# Patient Record
Sex: FEMALE | URBAN - METROPOLITAN AREA
[De-identification: names, ages, dates, MRNs, and addresses within clinical notes are randomized per-mention and may not be internally consistent; named-entity substitution may affect disease eponyms.]

---
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
## 2024-09-10 ENCOUNTER — APPOINTMENT (OUTPATIENT)
Dept: RADIOLOGY | Facility: HOSPITAL | Age: 24
End: 2024-09-10
Payer: MEDICAID

## 2024-09-10 ENCOUNTER — HOSPITAL ENCOUNTER (EMERGENCY)
Facility: HOSPITAL | Age: 24
Discharge: HOME | End: 2024-09-10
Attending: STUDENT IN AN ORGANIZED HEALTH CARE EDUCATION/TRAINING PROGRAM
Payer: MEDICAID

## 2024-09-10 VITALS
DIASTOLIC BLOOD PRESSURE: 51 MMHG | TEMPERATURE: 97.5 F | OXYGEN SATURATION: 98 % | WEIGHT: 130 LBS | HEIGHT: 61 IN | BODY MASS INDEX: 24.55 KG/M2 | HEART RATE: 88 BPM | RESPIRATION RATE: 18 BRPM | SYSTOLIC BLOOD PRESSURE: 96 MMHG
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
## 2024-09-10 PROCEDURE — 73130 X-RAY EXAM OF HAND: CPT | Mod: LT

## 2024-09-10 PROCEDURE — 99283 EMERGENCY DEPT VISIT LOW MDM: CPT

## 2024-09-10 PROCEDURE — 2500000001 HC RX 250 WO HCPCS SELF ADMINISTERED DRUGS (ALT 637 FOR MEDICARE OP): Performed by: STUDENT IN AN ORGANIZED HEALTH CARE EDUCATION/TRAINING PROGRAM

## 2024-09-10 PROCEDURE — 73130 X-RAY EXAM OF HAND: CPT | Mod: LEFT SIDE | Performed by: RADIOLOGY

## 2024-09-10 RX ORDER — AMOXICILLIN AND CLAVULANATE POTASSIUM 875; 125 MG/1; MG/1
1 TABLET, FILM COATED ORAL ONCE
Status: COMPLETED | OUTPATIENT
Start: 2024-09-10 | End: 2024-09-10

## 2024-09-10 RX ORDER — AMOXICILLIN AND CLAVULANATE POTASSIUM 875; 125 MG/1; MG/1
1 TABLET, FILM COATED ORAL EVERY 12 HOURS
Qty: 14 TABLET | Refills: 0 | Status: SHIPPED | OUTPATIENT
Start: 2024-09-10 | End: 2024-09-17

## 2024-09-10 ASSESSMENT — PAIN SCALES - GENERAL: PAINLEVEL_OUTOF10: 5 - MODERATE PAIN

## 2024-09-10 ASSESSMENT — LIFESTYLE VARIABLES
TOTAL SCORE: 0
HAVE YOU EVER FELT YOU SHOULD CUT DOWN ON YOUR DRINKING: NO
EVER FELT BAD OR GUILTY ABOUT YOUR DRINKING: NO
EVER HAD A DRINK FIRST THING IN THE MORNING TO STEADY YOUR NERVES TO GET RID OF A HANGOVER: NO
HAVE PEOPLE ANNOYED YOU BY CRITICIZING YOUR DRINKING: NO

## 2024-09-10 ASSESSMENT — PAIN DESCRIPTION - ORIENTATION: ORIENTATION: LEFT

## 2024-09-10 ASSESSMENT — PAIN DESCRIPTION - LOCATION: LOCATION: HAND

## 2024-09-10 ASSESSMENT — COLUMBIA-SUICIDE SEVERITY RATING SCALE - C-SSRS
6. HAVE YOU EVER DONE ANYTHING, STARTED TO DO ANYTHING, OR PREPARED TO DO ANYTHING TO END YOUR LIFE?: NO
1. IN THE PAST MONTH, HAVE YOU WISHED YOU WERE DEAD OR WISHED YOU COULD GO TO SLEEP AND NOT WAKE UP?: NO
2. HAVE YOU ACTUALLY HAD ANY THOUGHTS OF KILLING YOURSELF?: NO

## 2024-09-10 ASSESSMENT — PAIN DESCRIPTION - FREQUENCY: FREQUENCY: CONSTANT/CONTINUOUS

## 2024-09-10 ASSESSMENT — PAIN - FUNCTIONAL ASSESSMENT: PAIN_FUNCTIONAL_ASSESSMENT: 0-10

## 2024-09-10 NOTE — DISCHARGE INSTRUCTIONS
Please return to close ED for any worsening symptoms including fever, chills, excessive swelling, numbness/tingling in the hand, excessive bleeding, or weakness.  Please keep wound clean.  Please refrain from agitating wound, or doing any strenuous activity at this time.  Please take antibiotic course as prescribed.

## 2024-09-10 NOTE — ED PROVIDER NOTES
HPI   Chief Complaint   Patient presents with    Animal Bite     Dog bite at home 45 minutes prior to arrival. Bleeding controlled.        Patient is a 24-year-old female with no segment past medical history presenting to [facility] ED for dog bite injury.  Patient reports that her 2 dogs were fighting in her house, and she was trying to separate them and was caught by their teeth on her left hand.  Patient reports that both dogs were vaccinated.  Patient denies any numbness/tingling of the left hand.  Patient denies any loss of function.  Patient denies any significant/persistent bleeding.  Remaining review of system, otherwise unremarkable.              Patient History   Past Medical History:   Diagnosis Date    Anemia     Anxiety     Depression     Pre-eclampsia ([facility]CC)      History reviewed. No pertinent surgical history.  No family history on file.  Social History     Tobacco Use    Smoking status: Never    Smokeless tobacco: Never   Substance Use Topics    Alcohol use: Not on file    Drug use: Never       Physical Exam   ED Triage Vitals [09/10/24 1748]   Temperature Heart Rate Respirations BP   36.4 °C (97.5 °F) 83 16 128/62      Pulse Ox Temp src Heart Rate Source Patient Position   100 % -- -- --      BP Location FiO2 (%)     -- --       Physical Exam  Constitutional:       Appearance: Normal appearance. She is normal weight.   HENT:      Head: Normocephalic and atraumatic.      Nose: Nose normal.      Mouth/Throat:      Mouth: Mucous membranes are moist.      Pharynx: Oropharynx is clear.   Eyes:      Extraocular Movements: Extraocular movements intact.      Conjunctiva/sclera: Conjunctivae normal.      Pupils: Pupils are equal, round, and reactive to light.   Cardiovascular:      Rate and Rhythm: Normal rate and regular rhythm.      Pulses: Normal pulses.      Heart sounds: Normal heart sounds.   Pulmonary:      Effort: Pulmonary effort is normal.      Breath sounds: Normal breath sounds.   Abdominal:       General: Abdomen is flat. Bowel sounds are normal.      Palpations: Abdomen is soft.   Musculoskeletal:         General: Normal range of motion.      Cervical back: Normal range of motion and neck supple.   Skin:     General: Skin is warm and dry.      Capillary Refill: Capillary refill takes less than 2 seconds.      Comments: 1 cm laceration just inferior to the base of the fourth and fifth digits of the left palmar surface.  Superficial abrasions of the left palm as well   Neurological:      General: No focal deficit present.      Mental Status: She is alert and oriented to person, place, and time. Mental status is at baseline.   Psychiatric:         Mood and Affect: Mood normal.         Behavior: Behavior normal.           ED Course & MDM   Diagnoses as of 09/11/24 1446   Dog bite, initial encounter                 No data recorded     Pollo Coma Scale Score: 15 (09/10/24 1750 : Keyla Carreno RN)                           Medical Decision Making  Patient is a 24 y.o. female who presents to [facility] ED for Animal Bite (Dog bite at home 45 minutes prior to arrival. Bleeding controlled. ). On initial ED evaluation, patient found to be in no acute distress. Per HPI, concern to evaluate and treat for dog bite injury.  Obtain x-ray films of the left hand.  Administering first dose Augmentin antibiotics in ED.  Patient dog bite wound was flushed thoroughly, left open to continue drainage.  X-ray showed no underlying acute traumatic injury or retained foreign body.  Patient be discharged on outpatient course of Augmentin antibiotics.  Diagnostic findings and treatment plan discussed with patient.  Patient amenable to plan.    Rx given for Augmentin. Patient to follow up with PCP outpatient. Anticipatory guidance and return precautions provided.  Patient otherwise stable for discharge.          Procedure  Procedures     Caridad Rice MD  Resident  09/11/24 [id number]

## 2024-10-16 ENCOUNTER — OFFICE VISIT (OUTPATIENT)
Dept: URGENT CARE | Age: 24
End: 2024-10-16
Payer: MEDICAID

## 2024-10-16 VITALS
TEMPERATURE: 98.7 F | WEIGHT: 130 LBS | HEART RATE: 112 BPM | BODY MASS INDEX: 24.56 KG/M2 | DIASTOLIC BLOOD PRESSURE: 67 MMHG | SYSTOLIC BLOOD PRESSURE: 115 MMHG | RESPIRATION RATE: 16 BRPM | OXYGEN SATURATION: 97 %

## 2024-10-16 DIAGNOSIS — R52 BODY ACHES: ICD-10-CM

## 2024-10-16 DIAGNOSIS — J00 NASOPHARYNGITIS ACUTE: Primary | ICD-10-CM

## 2024-10-16 LAB
POC RAPID INFLUENZA A: NEGATIVE
POC RAPID INFLUENZA B: NEGATIVE
POC RAPID STREP: NEGATIVE
POC SARS-COV-2 AG BINAX: NORMAL

## 2024-10-16 RX ORDER — BROMPHENIRAMINE MALEATE, PSEUDOEPHEDRINE HYDROCHLORIDE, AND DEXTROMETHORPHAN HYDROBROMIDE 2; 30; 10 MG/5ML; MG/5ML; MG/5ML
10 SYRUP ORAL 3 TIMES DAILY PRN
Qty: 150 ML | Refills: 0 | Status: SHIPPED | OUTPATIENT
Start: 2024-10-16 | End: 2024-10-21

## 2024-10-16 NOTE — PROGRESS NOTES
Subjective   Patient ID: Aston Angel is a 24 y.o. female. They present today with a chief complaint of Illness (X 3 days, sore throat, headache, runny nose, body aches, nausea.).    History of Present Illness  Subjective  Aston Angel is a 24 y.o. female who presents for evaluation of symptoms of a URI. Symptoms include achiness, low grade fever, nasal congestion, and sore throat. Onset of symptoms was 2 days ago and has been stable since that time.     Discussed diagnosis and treatment of URI.  Suggested symptomatic OTC remedies.  Nasal saline spray for congestion.  Follow up as needed.        Illness      Past Medical History  Allergies as of 10/16/2024    (No Known Allergies)       (Not in a hospital admission)       Past Medical History:   Diagnosis Date    Anemia     Anxiety     Depression     Pre-eclampsia        History reviewed. No pertinent surgical history.     reports that she has never smoked. She has never used smokeless tobacco. She reports that she does not use drugs.    Review of Systems  Review of Systems                               Objective    Vitals:    10/16/24 1631   BP: 115/67   Pulse: (!) 112   Resp: 16   Temp: 37.1 °C (98.7 °F)   SpO2: 97%   Weight: 59 kg (130 lb)     No LMP recorded.    Physical Exam  Constitutional:       General: She is not in acute distress.     Appearance: Normal appearance.   HENT:      Right Ear: Tympanic membrane, ear canal and external ear normal.      Left Ear: Tympanic membrane, ear canal and external ear normal.      Nose: Congestion and rhinorrhea present.      Mouth/Throat:      Mouth: Mucous membranes are moist.      Pharynx: Oropharynx is clear.   Eyes:      Extraocular Movements: Extraocular movements intact.      Pupils: Pupils are equal, round, and reactive to light.   Cardiovascular:      Rate and Rhythm: Normal rate and regular rhythm.      Pulses: Normal pulses.      Heart sounds: Normal heart sounds.   Pulmonary:      Effort: Pulmonary effort is  normal. No respiratory distress.      Breath sounds: Normal breath sounds. No stridor. No wheezing or rhonchi.   Musculoskeletal:      Cervical back: Normal range of motion and neck supple. No rigidity or tenderness.   Neurological:      Mental Status: She is alert.         Procedures    Point of Care Test & Imaging Results from this visit  Results for orders placed or performed in visit on 10/16/24   POCT Covid-19 Rapid Antigen   Result Value Ref Range    POC SEJAL-COV-2 AG  Presumptive negative test for SARS-CoV-2 (no antigen detected)     Presumptive negative test for SARS-CoV-2 (no antigen detected)   POCT Influenza A/B manually resulted   Result Value Ref Range    POC Rapid Influenza A Negative Negative    POC Rapid Influenza B Negative Negative   POCT rapid strep A manually resulted   Result Value Ref Range    POC Rapid Strep Negative Negative      No results found.    Diagnostic study results (if any) were reviewed by Susan Argueta MD.    Assessment/Plan   Allergies, medications, history, and pertinent labs/EKGs/Imaging reviewed by Susan Argueta MD.    Orders and Diagnoses  Diagnoses and all orders for this visit:  Body aches  -     POCT Covid-19 Rapid Antigen  -     POCT Influenza A/B manually resulted  -     POCT rapid strep A manually resulted      Medical Admin Record      Patient disposition: Home    Electronically signed by Susan Argueta MD  4:40 PM

## 2024-10-16 NOTE — LETTER
October 16, 2024     Patient: Aston Angel   YOB: 2000   Date of Visit: 10/16/2024       To Whom It May Concern:    Aston Angel was seen in my clinic on 10/16/2024 at 4:25 pm. Please excuse Aston for her absence from work on this day to make the appointment.    If you have any questions or concerns, please don't hesitate to call.         Sincerely,         Susan Argueta MD        CC: No Recipients

## 2025-06-17 ENCOUNTER — OFFICE VISIT (OUTPATIENT)
Dept: OBSTETRICS AND GYNECOLOGY | Facility: CLINIC | Age: 25
End: 2025-06-17
Payer: COMMERCIAL

## 2025-06-17 ENCOUNTER — APPOINTMENT (OUTPATIENT)
Dept: PRIMARY CARE | Facility: CLINIC | Age: 25
End: 2025-06-17
Payer: COMMERCIAL

## 2025-06-17 ENCOUNTER — APPOINTMENT (OUTPATIENT)
Dept: LAB | Facility: HOSPITAL | Age: 25
End: 2025-06-17
Payer: COMMERCIAL

## 2025-06-17 ENCOUNTER — OFFICE VISIT (OUTPATIENT)
Dept: PRIMARY CARE | Facility: CLINIC | Age: 25
End: 2025-06-17
Payer: COMMERCIAL

## 2025-06-17 VITALS
WEIGHT: 115 LBS | SYSTOLIC BLOOD PRESSURE: 110 MMHG | DIASTOLIC BLOOD PRESSURE: 71 MMHG | BODY MASS INDEX: 21.71 KG/M2 | HEIGHT: 61 IN | HEART RATE: 75 BPM

## 2025-06-17 VITALS
HEIGHT: 61 IN | TEMPERATURE: 97 F | RESPIRATION RATE: 16 BRPM | HEART RATE: 82 BPM | BODY MASS INDEX: 21.52 KG/M2 | DIASTOLIC BLOOD PRESSURE: 78 MMHG | WEIGHT: 114 LBS | OXYGEN SATURATION: 99 % | SYSTOLIC BLOOD PRESSURE: 106 MMHG

## 2025-06-17 DIAGNOSIS — Z01.419 ENCOUNTER FOR GYNECOLOGICAL EXAMINATION: ICD-10-CM

## 2025-06-17 DIAGNOSIS — B35.6 TINEA CRURIS: Primary | ICD-10-CM

## 2025-06-17 DIAGNOSIS — Z13.9 SCREENING DUE: ICD-10-CM

## 2025-06-17 DIAGNOSIS — Z12.4 CERVICAL CANCER SCREENING: Primary | ICD-10-CM

## 2025-06-17 PROBLEM — D50.8 IRON DEFICIENCY ANEMIA SECONDARY TO INADEQUATE DIETARY IRON INTAKE: Status: ACTIVE | Noted: 2023-10-05

## 2025-06-17 PROBLEM — O14.93 PRE-ECLAMPSIA IN THIRD TRIMESTER (HHS-HCC): Status: ACTIVE | Noted: 2023-10-05

## 2025-06-17 PROBLEM — F32.A DEPRESSION: Status: ACTIVE | Noted: 2021-06-02

## 2025-06-17 PROBLEM — F41.9 ANXIETY: Status: ACTIVE | Noted: 2021-06-02

## 2025-06-17 PROCEDURE — 3074F SYST BP LT 130 MM HG: CPT

## 2025-06-17 PROCEDURE — 3078F DIAST BP <80 MM HG: CPT

## 2025-06-17 PROCEDURE — 1036F TOBACCO NON-USER: CPT

## 2025-06-17 PROCEDURE — 99203 OFFICE O/P NEW LOW 30 MIN: CPT | Performed by: NURSE PRACTITIONER

## 2025-06-17 PROCEDURE — 3008F BODY MASS INDEX DOCD: CPT

## 2025-06-17 PROCEDURE — 99385 PREV VISIT NEW AGE 18-39: CPT

## 2025-06-17 PROCEDURE — 87661 TRICHOMONAS VAGINALIS AMPLIF: CPT

## 2025-06-17 PROCEDURE — 87591 N.GONORRHOEAE DNA AMP PROB: CPT

## 2025-06-17 PROCEDURE — 87491 CHLMYD TRACH DNA AMP PROBE: CPT

## 2025-06-17 RX ORDER — KETOCONAZOLE 20 MG/G
CREAM TOPICAL DAILY
Qty: 30 G | Refills: 0 | Status: SHIPPED | OUTPATIENT
Start: 2025-06-17 | End: 2025-07-01

## 2025-06-17 ASSESSMENT — ENCOUNTER SYMPTOMS
DYSURIA: 0
CHILLS: 0
COUGH: 0
SORE THROAT: 0
POLYPHAGIA: 0
SORE THROAT: 0
FEVER: 0
WEAKNESS: 0
FREQUENCY: 0
EYE PAIN: 0
DIARRHEA: 0
MYALGIAS: 0
SINUS PRESSURE: 0
FATIGUE: 0
SHORTNESS OF BREATH: 0
BACK PAIN: 0
FATIGUE: 0
RHINORRHEA: 0
COUGH: 0
VOMITING: 0
ANOREXIA: 0
VOMITING: 0
PALPITATIONS: 0
DIZZINESS: 0
CONSTIPATION: 0
NAIL CHANGES: 0
NAUSEA: 0
DYSPHORIC MOOD: 0
SHORTNESS OF BREATH: 0
NERVOUS/ANXIOUS: 0
FEVER: 0
EYE PAIN: 0
ABDOMINAL PAIN: 0
DEPRESSION: 0
HEADACHES: 0
SLEEP DISTURBANCE: 0
WHEEZING: 0
POLYDIPSIA: 0
DIARRHEA: 0
SINUS PAIN: 0

## 2025-06-17 NOTE — PROGRESS NOTES
Subjective   Patient ID: Aston Angel is a 25 y.o. female who presents for Establish Care.    Patient is being seen today to establish care and for possible yeast in her groin area, She states it is very itchy and red. She has tried using several OTC creams that have helped with some of the itching but has not gone away fully.     Vaginal Itching  The patient's primary symptoms include genital itching and a genital rash. The patient's pertinent negatives include no genital odor, pelvic pain or vaginal discharge. This is a new problem. The current episode started more than 1 month ago (2 months ago). The problem occurs constantly. The problem has been waxing and waning. The patient is experiencing no pain. The problem affects both sides. She is not pregnant. Associated symptoms include rash. Pertinent negatives include no abdominal pain, back pain, chills, constipation, diarrhea, discolored urine, dysuria, fever, frequency, headaches, nausea, sore throat, urgency or vomiting.   She states that the rash is in her skin folds bilaterally in groin area. She has tried jock itch cream with little.    She states that she is actively trying to get pregnant. She has an appointment scheduled with GYN later this afternoon.     LMP: Approximately 2 weeks ago.    Review of Systems   Constitutional:  Negative for chills, fatigue and fever.   HENT:  Negative for congestion, sinus pressure, sinus pain and sore throat.    Eyes:  Negative for pain and visual disturbance.   Respiratory:  Negative for cough, shortness of breath and wheezing.    Cardiovascular:  Negative for chest pain and palpitations.   Gastrointestinal:  Negative for abdominal pain, constipation, diarrhea, nausea and vomiting.   Endocrine: Negative for polydipsia, polyphagia and polyuria.   Genitourinary:  Negative for dyspareunia, dysuria, frequency, menstrual problem, pelvic pain, urgency, vaginal discharge and vaginal pain.   Musculoskeletal:  Negative for back  "pain and myalgias.   Skin:  Positive for rash.   Neurological:  Negative for dizziness, weakness and headaches.   Psychiatric/Behavioral:  Negative for dysphoric mood and sleep disturbance. The patient is not nervous/anxious.        Objective   /78 (BP Location: Left arm, Patient Position: Sitting, BP Cuff Size: Adult)   Pulse 82   Temp 36.1 °C (97 °F) (Temporal)   Resp 16   Ht 1.549 m (5' 1\")   Wt 51.7 kg (114 lb)   SpO2 99%   BMI 21.54 kg/m²     Physical Exam  Vitals and nursing note reviewed.   Constitutional:       Appearance: Normal appearance. She is normal weight.   HENT:      Head: Normocephalic and atraumatic.      Right Ear: Tympanic membrane normal.      Left Ear: Tympanic membrane normal.      Nose: No congestion.      Mouth/Throat:      Mouth: Mucous membranes are moist.      Pharynx: Oropharynx is clear. No oropharyngeal exudate or posterior oropharyngeal erythema.   Eyes:      Conjunctiva/sclera: Conjunctivae normal.      Pupils: Pupils are equal, round, and reactive to light.   Cardiovascular:      Rate and Rhythm: Normal rate and regular rhythm.      Heart sounds: Normal heart sounds.   Pulmonary:      Effort: Pulmonary effort is normal.      Breath sounds: Normal breath sounds. No wheezing, rhonchi or rales.   Abdominal:      General: Abdomen is flat. Bowel sounds are normal.      Palpations: Abdomen is soft.      Tenderness: There is no abdominal tenderness.   Musculoskeletal:      Right lower leg: No edema.      Left lower leg: No edema.   Lymphadenopathy:      Cervical: No cervical adenopathy.   Skin:     Findings: Rash present.      Comments: Erythematous, scaly appearing rash noted in bilateral groin folds. No drainage noted.   Neurological:      Mental Status: She is alert and oriented to person, place, and time.      Gait: Gait normal.   Psychiatric:         Mood and Affect: Mood normal.         Behavior: Behavior normal.         Assessment/Plan   Problem List Items Addressed " This Visit    None  Visit Diagnoses         Codes      Tinea cruris    -  Primary B35.6    Relevant Medications    ketoconazole (NIZOral) 2 % cream      Screening due     Z13.9    Relevant Orders    CBC and Auto Differential    Comprehensive Metabolic Panel    Lipid panel    Tsh With Reflex To Free T4 If Abnormal      Body mass index (BMI) of 21.0 to 21.9 in adult     Z68.21        Check labs   Start ketoconazole cream daily as directed.   Discussed short burst of steroid to help with itching, discussed not being able to take if pregnant. As she is actively trying to conceive, will hold on steroids for now and follow up in 2 weeks for recheck, or sooner with any additional concerns.

## 2025-06-17 NOTE — PATIENT INSTRUCTIONS
Check labs and start ketoconazole cream as directed. Follow up in 2 weeks for recheck, or sooner with any additional concerns.

## 2025-06-17 NOTE — PROGRESS NOTES
Subjective   Patient ID: Aston Angel is a 25 y.o. female who presents for Annual Exam.  19 month old female () presenting to [facility]. She s/p  delivery, IOL at 39 weeks for sPEC. She breastfeed for 6 months, no breast issues today. Periods are regular monthly, lasting 5-6 days, moderate flow.    LMP   Last pap : neg  Mammogram at 40 unless otherwise indicated    Medical history: Overall healthy    Surgical history: Denies   Denies family history of breast, uterine, ovarian cancer  Social history: No tobacco, ETOH      Review of Systems   All other systems reviewed and are negative.      Objective   Physical Exam  Vitals and nursing note reviewed.   Constitutional:       Appearance: Normal appearance.   HENT:      Head: Normocephalic.      Nose: Nose normal.      Mouth/Throat:      Mouth: Mucous membranes are moist.      Pharynx: Oropharynx is clear.   Eyes:      Conjunctiva/sclera: Conjunctivae normal.      Pupils: Pupils are equal, round, and reactive to light.   Cardiovascular:      Rate and Rhythm: Normal rate and regular rhythm.      Pulses: Normal pulses.   Pulmonary:      Effort: Pulmonary effort is normal.      Breath sounds: Normal breath sounds.   Chest:   Breasts:     Right: Normal.      Left: Normal.   Abdominal:      General: Bowel sounds are normal.      Palpations: Abdomen is soft.   Genitourinary:     General: Normal vulva.      Exam position: Lithotomy position.      Vagina: Normal.      Cervix: Normal.      Uterus: Normal.       Adnexa: Right adnexa normal and left adnexa normal.      Rectum: Normal.      Comments: Cervical ectropion- no post coital bleeding  Musculoskeletal:         General: Normal range of motion.      Cervical back: Normal range of motion and neck supple.   Skin:     General: Skin is warm and dry.      Capillary Refill: Capillary refill takes less than 2 seconds.   Neurological:      General: No focal deficit present.      Mental Status: She is alert and  oriented to person, place, and time. Mental status is at baseline.   Psychiatric:         Mood and Affect: Mood normal.         Assessment/Plan   Problem List Items Addressed This Visit    None  Visit Diagnoses         Codes      Cervical cancer screening    -  Primary Z12.4    Relevant Orders    THINPREP PAP TEST (25-30)      Encounter for gynecological examination     Z01.419                 OLU Mendoza-CNP 06/17/25 6:59 PM

## 2025-06-18 LAB
ALBUMIN SERPL-MCNC: 4.5 G/DL (ref 3.6–5.1)
ALP SERPL-CCNC: 61 U/L (ref 31–125)
ALT SERPL-CCNC: 30 U/L (ref 6–29)
ANION GAP SERPL CALCULATED.4IONS-SCNC: 7 MMOL/L (CALC) (ref 7–17)
AST SERPL-CCNC: 19 U/L (ref 10–30)
BASOPHILS # BLD AUTO: 57 CELLS/UL (ref 0–200)
BASOPHILS NFR BLD AUTO: 0.9 %
BILIRUB SERPL-MCNC: 0.4 MG/DL (ref 0.2–1.2)
BUN SERPL-MCNC: 8 MG/DL (ref 7–25)
C TRACH RRNA SPEC QL NAA+PROBE: NEGATIVE
CALCIUM SERPL-MCNC: 9.4 MG/DL (ref 8.6–10.2)
CHLORIDE SERPL-SCNC: 104 MMOL/L (ref 98–110)
CHOLEST SERPL-MCNC: 205 MG/DL
CHOLEST/HDLC SERPL: 3.5 (CALC)
CO2 SERPL-SCNC: 28 MMOL/L (ref 20–32)
COTININE UR QL SCN: NEGATIVE
CREAT SERPL-MCNC: 0.55 MG/DL (ref 0.5–0.96)
EGFRCR SERPLBLD CKD-EPI 2021: 130 ML/MIN/1.73M2
EOSINOPHIL # BLD AUTO: 120 CELLS/UL (ref 15–500)
EOSINOPHIL NFR BLD AUTO: 1.9 %
ERYTHROCYTE [DISTWIDTH] IN BLOOD BY AUTOMATED COUNT: 12.5 % (ref 11–15)
GLUCOSE SERPL-MCNC: 91 MG/DL (ref 65–99)
HCT VFR BLD AUTO: 39.8 % (ref 35–45)
HDLC SERPL-MCNC: 58 MG/DL
HGB BLD-MCNC: 13 G/DL (ref 11.7–15.5)
LDLC SERPL CALC-MCNC: 135 MG/DL (CALC)
LYMPHOCYTES # BLD AUTO: 2489 CELLS/UL (ref 850–3900)
LYMPHOCYTES NFR BLD AUTO: 39.5 %
MCH RBC QN AUTO: 29.9 PG (ref 27–33)
MCHC RBC AUTO-ENTMCNC: 32.7 G/DL (ref 32–36)
MCV RBC AUTO: 91.5 FL (ref 80–100)
MONOCYTES # BLD AUTO: 347 CELLS/UL (ref 200–950)
MONOCYTES NFR BLD AUTO: 5.5 %
N GONORRHOEA DNA SPEC QL PROBE+SIG AMP: NEGATIVE
NEUTROPHILS # BLD AUTO: 3289 CELLS/UL (ref 1500–7800)
NEUTROPHILS NFR BLD AUTO: 52.2 %
NONHDLC SERPL-MCNC: 147 MG/DL (CALC)
PLATELET # BLD AUTO: 399 THOUSAND/UL (ref 140–400)
PMV BLD REES-ECKER: 9.7 FL (ref 7.5–12.5)
POTASSIUM SERPL-SCNC: 4.3 MMOL/L (ref 3.5–5.3)
PROT SERPL-MCNC: 7.1 G/DL (ref 6.1–8.1)
RBC # BLD AUTO: 4.35 MILLION/UL (ref 3.8–5.1)
SODIUM SERPL-SCNC: 139 MMOL/L (ref 135–146)
T VAGINALIS RRNA SPEC QL NAA+PROBE: NEGATIVE
TRIGL SERPL-MCNC: 41 MG/DL
TSH SERPL-ACNC: 2.5 MIU/L
WBC # BLD AUTO: 6.3 THOUSAND/UL (ref 3.8–10.8)

## 2025-07-01 ENCOUNTER — APPOINTMENT (OUTPATIENT)
Dept: PRIMARY CARE | Facility: CLINIC | Age: 25
End: 2025-07-01
Payer: COMMERCIAL

## 2025-07-01 VITALS
SYSTOLIC BLOOD PRESSURE: 102 MMHG | OXYGEN SATURATION: 99 % | HEIGHT: 61 IN | DIASTOLIC BLOOD PRESSURE: 64 MMHG | WEIGHT: 113.8 LBS | BODY MASS INDEX: 21.49 KG/M2 | HEART RATE: 90 BPM | TEMPERATURE: 98.3 F | RESPIRATION RATE: 16 BRPM

## 2025-07-01 DIAGNOSIS — Z32.01 POSITIVE PREGNANCY TEST (HHS-HCC): ICD-10-CM

## 2025-07-01 DIAGNOSIS — B35.6 TINEA CRURIS: Primary | ICD-10-CM

## 2025-07-01 PROCEDURE — 99213 OFFICE O/P EST LOW 20 MIN: CPT | Performed by: NURSE PRACTITIONER

## 2025-07-01 ASSESSMENT — ENCOUNTER SYMPTOMS
NERVOUS/ANXIOUS: 0
FREQUENCY: 0
WEAKNESS: 0
WHEEZING: 0
ABDOMINAL PAIN: 0
NAUSEA: 1
EYE PAIN: 0
FATIGUE: 1
POLYDIPSIA: 0
SLEEP DISTURBANCE: 0
PALPITATIONS: 0
BACK PAIN: 0
SORE THROAT: 0
DYSURIA: 0
CHILLS: 0
MYALGIAS: 0
SINUS PRESSURE: 0
SHORTNESS OF BREATH: 0
COUGH: 0
VOMITING: 0
HEADACHES: 0
DIARRHEA: 0
SINUS PAIN: 0
DIZZINESS: 0
FEVER: 0
CONSTIPATION: 0
POLYPHAGIA: 0
DYSPHORIC MOOD: 0

## 2025-07-01 ASSESSMENT — PATIENT HEALTH QUESTIONNAIRE - PHQ9
2. FEELING DOWN, DEPRESSED OR HOPELESS: NOT AT ALL
SUM OF ALL RESPONSES TO PHQ9 QUESTIONS 1 AND 2: 0
1. LITTLE INTEREST OR PLEASURE IN DOING THINGS: NOT AT ALL

## 2025-07-01 NOTE — PROGRESS NOTES
"Subjective   Patient ID: Aston Angel is a 25 y.o. female who presents for Rash.    Symptoms: 2 week f/up rash in groin getting better  Length of symptoms: 2 weeks ago  OTC: none    Rash  This is a recurrent problem. The current episode started 1 to 4 weeks ago. The problem has been gradually improving since onset. The affected locations include the groin. The rash is characterized by itchiness. She was exposed to nothing. Associated symptoms include fatigue. Pertinent negatives include no congestion, cough, diarrhea, eye pain, fever, shortness of breath, sore throat or vomiting. Treatments tried: topical ketoconazole. The treatment provided moderate relief.      She states that she is still experiencing some itching, but overall rash has improved.     She took pregnancy tests at home which were positive. She is wanting a blood hcg test ordered today.     Review of Systems   Constitutional:  Positive for fatigue. Negative for chills and fever.   HENT:  Negative for congestion, sinus pressure, sinus pain and sore throat.    Eyes:  Negative for pain and visual disturbance.   Respiratory:  Negative for cough, shortness of breath and wheezing.    Cardiovascular:  Negative for chest pain and palpitations.   Gastrointestinal:  Positive for nausea. Negative for abdominal pain, constipation, diarrhea and vomiting.   Endocrine: Negative for polydipsia, polyphagia and polyuria.   Genitourinary:  Negative for dyspareunia, dysuria, frequency, menstrual problem, pelvic pain, urgency, vaginal discharge and vaginal pain.   Musculoskeletal:  Negative for back pain and myalgias.   Skin:  Positive for rash.   Neurological:  Negative for dizziness, weakness and headaches.   Psychiatric/Behavioral:  Negative for dysphoric mood and sleep disturbance. The patient is not nervous/anxious.        Objective   /64   Pulse 90   Temp 36.8 °C (98.3 °F)   Resp 16   Ht (!) 1.549 m (5' 1\")   Wt 51.6 kg (113 lb 12.8 oz)   LMP " 06/04/2025   SpO2 99%   BMI 21.50 kg/m²     Physical Exam  Vitals and nursing note reviewed.   Constitutional:       Appearance: Normal appearance. She is normal weight.   Cardiovascular:      Rate and Rhythm: Normal rate and regular rhythm.      Heart sounds: Normal heart sounds.   Pulmonary:      Effort: Pulmonary effort is normal.      Breath sounds: Normal breath sounds.   Abdominal:      General: Abdomen is flat.      Palpations: Abdomen is soft.   Musculoskeletal:      Right lower leg: No edema.      Left lower leg: No edema.   Skin:     General: Skin is warm and dry.      Comments: Dry skin noted in skin folds of bilateral groin. No erythema, warmth or drainage noted.   Neurological:      Mental Status: She is alert.   Psychiatric:         Mood and Affect: Mood normal.         Behavior: Behavior normal.         Assessment/Plan   Problem List Items Addressed This Visit    None  Visit Diagnoses         Codes      Tinea cruris    -  Primary B35.6      Positive pregnancy test ([facility])     Z32.01    Relevant Orders    [facility] HCG, TOTAL, QN      Body mass index (BMI) of 21.0 to 21.9 in adult     Z68.21        Tinea: Rash much improved.   May continue with ketoconazole for an additional week.   Follow up as needed with any persisting or returning symptoms.     Positive home pregnancy: Encouraged taking prenatal vitamins. HCG ordered today.   Keep appointment with OBGYN as scheduled.

## 2025-07-02 LAB — B-HCG SERPL-ACNC: 624 MIU/ML

## 2025-07-07 DIAGNOSIS — Z32.01 POSITIVE PREGNANCY TEST (HHS-HCC): Primary | ICD-10-CM

## 2025-07-10 LAB — B-HCG SERPL-ACNC: 9020 MIU/ML

## 2025-07-14 LAB
CYTOLOGY CMNT CVX/VAG CYTO-IMP: NORMAL
LAB AP HPV GENOTYPE QUESTION: NO
LAB AP HPV HR: NORMAL
LAB AP PAP ADDITIONAL TESTS: NORMAL
LABORATORY COMMENT REPORT: NORMAL
LMP START DATE: NORMAL
PATH REPORT.TOTAL CANCER: NORMAL

## 2025-07-17 PROBLEM — F32.A DEPRESSION: Status: RESOLVED | Noted: 2021-06-02 | Resolved: 2025-07-17

## 2025-07-17 PROBLEM — O14.93 PRE-ECLAMPSIA IN THIRD TRIMESTER (HHS-HCC): Status: RESOLVED | Noted: 2023-10-05 | Resolved: 2025-07-17

## 2025-07-17 PROBLEM — Z87.59 HISTORY OF PRE-ECLAMPSIA: Status: ACTIVE | Noted: 2023-10-13

## 2025-07-17 PROBLEM — F32.A ANXIETY AND DEPRESSION: Status: ACTIVE | Noted: 2021-06-02

## 2025-07-17 PROBLEM — Z3A.01 6 WEEKS GESTATION OF PREGNANCY (HHS-HCC): Status: ACTIVE | Noted: 2025-07-17

## 2025-07-17 PROBLEM — D50.8 IRON DEFICIENCY ANEMIA SECONDARY TO INADEQUATE DIETARY IRON INTAKE: Status: RESOLVED | Noted: 2023-10-05 | Resolved: 2025-07-17

## 2025-07-18 ENCOUNTER — APPOINTMENT (OUTPATIENT)
Dept: OBSTETRICS AND GYNECOLOGY | Facility: CLINIC | Age: 25
End: 2025-07-18
Payer: COMMERCIAL

## 2025-07-18 VITALS — DIASTOLIC BLOOD PRESSURE: 72 MMHG | BODY MASS INDEX: 21.39 KG/M2 | WEIGHT: 113.2 LBS | SYSTOLIC BLOOD PRESSURE: 109 MMHG

## 2025-07-18 DIAGNOSIS — F41.9 ANXIETY AND DEPRESSION: ICD-10-CM

## 2025-07-18 DIAGNOSIS — F32.A ANXIETY AND DEPRESSION: ICD-10-CM

## 2025-07-18 DIAGNOSIS — Z87.59 HISTORY OF PRE-ECLAMPSIA: ICD-10-CM

## 2025-07-18 DIAGNOSIS — O21.9 NAUSEA AND VOMITING IN PREGNANCY PRIOR TO 22 WEEKS GESTATION: ICD-10-CM

## 2025-07-18 DIAGNOSIS — Z3A.01 6 WEEKS GESTATION OF PREGNANCY (HHS-HCC): Primary | ICD-10-CM

## 2025-07-18 PROCEDURE — 0500F INITIAL PRENATAL CARE VISIT: CPT | Performed by: STUDENT IN AN ORGANIZED HEALTH CARE EDUCATION/TRAINING PROGRAM

## 2025-07-18 RX ORDER — PYRIDOXINE HCL (VITAMIN B6) 25 MG
25 TABLET ORAL EVERY 6 HOURS PRN
Qty: 60 TABLET | Refills: 1 | Status: SHIPPED | OUTPATIENT
Start: 2025-07-18 | End: 2025-08-17

## 2025-07-18 RX ORDER — NAPROXEN SODIUM 220 MG/1
162 TABLET, FILM COATED ORAL DAILY
Qty: 180 TABLET | Refills: 2 | Status: SHIPPED | OUTPATIENT
Start: 2025-07-18 | End: 2026-04-14

## 2025-07-18 ASSESSMENT — EDINBURGH POSTNATAL DEPRESSION SCALE (EPDS)
I HAVE BLAMED MYSELF UNNECESSARILY WHEN THINGS WENT WRONG: YES, SOME OF THE TIME
THINGS HAVE BEEN GETTING ON TOP OF ME: NO, MOST OF THE TIME I HAVE COPED QUITE WELL
I HAVE BEEN SO UNHAPPY THAT I HAVE BEEN CRYING: NO, NEVER
I HAVE FELT SCARED OR PANICKY FOR NO GOOD REASON: YES, SOMETIMES
I HAVE FELT SAD OR MISERABLE: NOT VERY OFTEN
THE THOUGHT OF HARMING MYSELF HAS OCCURRED TO ME: NEVER
I HAVE BEEN ANXIOUS OR WORRIED FOR NO GOOD REASON: YES, VERY OFTEN
I HAVE BEEN SO UNHAPPY THAT I HAVE HAD DIFFICULTY SLEEPING: NOT VERY OFTEN
TOTAL SCORE: 10
I HAVE BEEN ABLE TO LAUGH AND SEE THE FUNNY SIDE OF THINGS: AS MUCH AS I ALWAYS COULD
I HAVE LOOKED FORWARD WITH ENJOYMENT TO THINGS: AS MUCH AS I EVER DID

## 2025-07-18 NOTE — PROGRESS NOTES
Subjective   Patient ID [id number]   Aston Angel is a 25 y.o.  at 6w2d with a working estimated date of delivery of 3/11/2026, by Last Menstrual Period who presents for an initial prenatal visit. LMP certain. Daily nausea without vomiting. Some cramping, no bleeding. FOB aware and involved    Her pregnancy is notable for:  -History of sPEC with delivery at 37 weeks in prior pregnancy  -History of VAVD for NRFHT    OBHx: VAVD at 37 weeks, sPEC  GynHx: denies STIs or excisional cervical procedures  PMHx: anxiety/depression, no meds  SurgHx: denies  Meds: PNV  Allergies: latex  Social: denies t/e/I  FHx: reviewed and non-contributory to presenting complaint    OB History    Para Term  AB Living   2 1 1 0  1   SAB IAB Ectopic Multiple Live Births       1      # Outcome Date GA Lbr Mitchell/2nd Weight Sex Type Anes PTL Lv   2 Current            1 Term 10/10/23 37w1d  2.551 kg F Vag-Vacuum None Y VIOLA      Birth Comments: VAVD for NRFHT after IOL for PEC     Indian  Depression Scale Total: 10    Objective   Physical Exam  Weight: 51.3 kg (113 lb 3.2 oz)  Expected Total Weight Gain: 11.5 kg (25 lb)-16 kg (35 lb)   Pregravid BMI: 21.40  BP: 109/72  TVUS: single viable IUP measuring 6w4d with +FCA    Assessment/Plan     Aston Angel is a 25 y.o.  at 6w2d with a working estimated date of delivery of 3/11/2026, by Last Menstrual Period who presents for an initial prenatal visit.     Single viable IUP measuring 6w4d on bedside ultrasound today. Taking PNV. B6 and unisom prescribed for nausea. PNLs and NT ordered. Discussed genetics and carrier testing, desires both, will draw with PNLs next visit.  Reviewed recommendation for universal ASA PPx to start at 12 weeks, Rx'ed today. Pre-pregnancy BMI = 21, reviewed recommendation for 25-35 lb total gestational weight gain. Pap UTD. History of PEC in prior pregnancy, for baseline HELLP labs. Oriented to practice, [facility] care, [facility]  Remainder of plan per problem list below.     Problem List Items Addressed This Visit       6 weeks gestation of pregnancy ([facility]) - Primary    Overview   Next Visit  [ ] Remind to start ASA (rx'ed at new OB)  [ ] PNLs including HELP labs, genetics, carrier (neg CF in G1)  [ ] Confirm scheduled for NT    Desired provider in labor: [] CNM  [x] Physician  [x] Blood Products: [x] Yes, accepts [] No, needs counseling  [x] Initial BMI: 21.40   [] Prenatal Labs:   [x] Hepatitis B Screening: immunized x3  [x] Cervical Cancer Screening: pap NIL (06/2025)  [x] Rh status: positive  [x] Screen for IPV and Substance Use Risk:  [] Genetic Screening: desires cfDNA and carrier testing (already CF neg)   [] First Trimester Anatomy Screen (11-13.6 wks): pt to schedule  [x] Baby ASA: Rx'ed at new OB  [x] Pregnancy dated by: LMP c/w 6 week office scan    [] Anatomy US (19-20 wks):   [] Federal Sterilization consent signed:   [] 1hr GCT at 24-28wks:  [] Fetal Surveillance:   [] Tdap (27-32 wks, may be given up to 36 wks if initial window missed):   [] RSV (32-36 wks) (Sept. to end of Jan):   [] Flu Vaccine:  [] COVID Vaccine:    [] Breastfeeding:  [] Pediatrician:  [] Birth Preferences:  [] Postpartum Birth control method:   [] GBS at 36 - 37 wks:  [] IOL vs. expectant management:  [] Mode of delivery (anticipated):          Anxiety and depression    Overview   -No meds         History of pre-eclampsia    Overview   -PEC w/o SF in first pregnancy  -For baseline HELLP labs and ASA PPx          RTC in 4 weeks.    Annalise Smith MD

## 2025-07-20 LAB
BACTERIA UR CULT: NORMAL
C TRACH RRNA SPEC QL NAA+PROBE: NOT DETECTED
CREAT UR-MCNC: 41 MG/DL (ref 20–275)
N GONORRHOEA RRNA SPEC QL NAA+PROBE: NOT DETECTED
PROT UR-MCNC: <4 MG/DL (ref 5–24)
PROT/CREAT UR: ABNORMAL MG/G CREAT (ref 24–184)
PROT/CREAT UR: ABNORMAL MG/MG CREAT (ref 0.02–0.18)
QUEST GC CT AMPLIFIED (ALWAYS MESSAGE): NORMAL

## 2025-07-22 ENCOUNTER — APPOINTMENT (OUTPATIENT)
Dept: OBSTETRICS AND GYNECOLOGY | Facility: CLINIC | Age: 25
End: 2025-07-22
Payer: COMMERCIAL

## 2025-08-05 ENCOUNTER — APPOINTMENT (OUTPATIENT)
Dept: OBSTETRICS AND GYNECOLOGY | Facility: CLINIC | Age: 25
End: 2025-08-05
Payer: MEDICAID

## 2025-08-05 ENCOUNTER — ROUTINE PRENATAL (OUTPATIENT)
Dept: OBSTETRICS AND GYNECOLOGY | Facility: CLINIC | Age: 25
End: 2025-08-05
Payer: COMMERCIAL

## 2025-08-05 VITALS — BODY MASS INDEX: 21.58 KG/M2 | DIASTOLIC BLOOD PRESSURE: 77 MMHG | SYSTOLIC BLOOD PRESSURE: 119 MMHG | WEIGHT: 114.2 LBS

## 2025-08-05 DIAGNOSIS — R42 DIZZY: ICD-10-CM

## 2025-08-05 DIAGNOSIS — Z34.90 PREGNANCY, UNSPECIFIED GESTATIONAL AGE (HHS-HCC): ICD-10-CM

## 2025-08-05 DIAGNOSIS — F41.9 ANXIETY AND DEPRESSION: ICD-10-CM

## 2025-08-05 DIAGNOSIS — F32.A ANXIETY AND DEPRESSION: ICD-10-CM

## 2025-08-05 DIAGNOSIS — Z87.59 HISTORY OF PRE-ECLAMPSIA: ICD-10-CM

## 2025-08-05 DIAGNOSIS — Z3A.08 8 WEEKS GESTATION OF PREGNANCY (HHS-HCC): Primary | ICD-10-CM

## 2025-08-05 DIAGNOSIS — R00.0 TACHYCARDIA: ICD-10-CM

## 2025-08-05 PROCEDURE — 0501F PRENATAL FLOW SHEET: CPT | Performed by: OBSTETRICS & GYNECOLOGY

## 2025-08-05 NOTE — PROGRESS NOTES
Patient presents for OBFU  C/O: dizzy spells, heart racing at work ranging 120's-130's, SOB at times.     Bing Zamorano MA II    Routine prenatal visit     Subjective    HPI:  Add on visit due to pt having continued episodes of intermittent dizziness. Also has noticed heart rate in the 120s-130s sometimes without much exertion.  She is worried about this.  Denies chest pain or shortness of breath.  Will work on cardiology appt.  Message sent to office staff to schedule.     Objective    Vital Signs  /77   Wt 51.8 kg (114 lb 3.2 oz)   LMP 2025   BMI 21.58 kg/m²     Aston Angel is a 25 y.o. yo  at 8w6d here for the following concerns which we addressed today:     Medical Problems       Problem List       History of pre-eclampsia    Overview Addendum 2025 11:56 AM by Annalise Smith MD   -PEC w/o SF in first pregnancy  -For baseline HELLP labs and ASA PPx  -Baseline P:C too low to calculate         Anxiety and depression    Overview Signed 2025  7:49 PM by Annalise Smith MD   -No meds         8 weeks gestation of pregnancy ([facility])    Overview Addendum 2025 11:56 AM by Annalise Smith MD   Next Visit  [ ] Remind to start ASA (rx'ed at new OB)  [ ] PNLs including HELP labs, genetics, carrier (neg CF in G1)  [ ] Confirm scheduled for NT    Desired provider in labor: [] CNM  [x] Physician  [x] Blood Products: [x] Yes, accepts [] No, needs counseling  [x] Initial BMI: 21.40   [] Prenatal Labs:   [x] Hepatitis B Screening: immunized x3  [x] Cervical Cancer Screening: pap NIL (2025)  [x] Rh status: positive  [x] Screen for IPV and Substance Use Risk:  [] Genetic Screening: desires cfDNA and carrier testing (already CF neg)   [] First Trimester Anatomy Screen (11-13.6 wks): pt to schedule  [x] Baby ASA: Rx'ed at new OB  [x] Pregnancy dated by: LMP c/w 6 week office scan    [] Anatomy US (19-20 wks):   [] Federal Sterilization consent signed:   [] 1hr GCT at 24-28wks:  [] Fetal  Surveillance:   [] Tdap (27-32 wks, may be given up to 36 wks if initial window missed):   [] RSV (32-36 wks) (Sept. to end of Jan):   [] Flu Vaccine:  [] COVID Vaccine:    [] Breastfeeding:  [] Pediatrician:  [] Birth Preferences:  [] Postpartum Birth control method:   [] GBS at 36 - 37 wks:  [] IOL vs. expectant management:  [] Mode of delivery (anticipated):          Tachycardia    Overview Signed 8/5/2025  2:00 PM by Saundra Wolf MD   - c/o shortness of breath and tachycardia  <> cards appt              Keep next prenatal appt with Dr. Smith as scheduled.

## 2025-08-14 ENCOUNTER — APPOINTMENT (OUTPATIENT)
Dept: OBSTETRICS AND GYNECOLOGY | Facility: CLINIC | Age: 25
End: 2025-08-14
Payer: COMMERCIAL

## 2025-08-14 VITALS — DIASTOLIC BLOOD PRESSURE: 76 MMHG | WEIGHT: 111 LBS | SYSTOLIC BLOOD PRESSURE: 116 MMHG | BODY MASS INDEX: 20.97 KG/M2

## 2025-08-14 DIAGNOSIS — Z87.59 HISTORY OF PRE-ECLAMPSIA: ICD-10-CM

## 2025-08-14 DIAGNOSIS — Z3A.10 10 WEEKS GESTATION OF PREGNANCY (HHS-HCC): Primary | ICD-10-CM

## 2025-08-14 DIAGNOSIS — R00.0 TACHYCARDIA: ICD-10-CM

## 2025-08-14 LAB
ABO GROUP (TYPE) IN BLOOD: NORMAL
ANTIBODY SCREEN: NORMAL
RH FACTOR (ANTIGEN D): NORMAL

## 2025-08-14 PROCEDURE — 86900 BLOOD TYPING SEROLOGIC ABO: CPT

## 2025-08-14 PROCEDURE — 83020 HEMOGLOBIN ELECTROPHORESIS: CPT | Performed by: STUDENT IN AN ORGANIZED HEALTH CARE EDUCATION/TRAINING PROGRAM

## 2025-08-14 PROCEDURE — 86850 RBC ANTIBODY SCREEN: CPT

## 2025-08-14 PROCEDURE — 85027 COMPLETE CBC AUTOMATED: CPT

## 2025-08-14 PROCEDURE — 83021 HEMOGLOBIN CHROMOTOGRAPHY: CPT

## 2025-08-14 PROCEDURE — 86901 BLOOD TYPING SEROLOGIC RH(D): CPT

## 2025-08-14 PROCEDURE — 0501F PRENATAL FLOW SHEET: CPT | Performed by: STUDENT IN AN ORGANIZED HEALTH CARE EDUCATION/TRAINING PROGRAM

## 2025-08-14 PROCEDURE — 81243 FMR1 GEN ALY DETC ABNL ALLEL: CPT

## 2025-08-14 PROCEDURE — 81329 SMN1 GENE DOS/DELETION ALYS: CPT

## 2025-08-15 LAB
ALBUMIN SERPL-MCNC: 4.8 G/DL (ref 3.6–5.1)
ALP SERPL-CCNC: 44 U/L (ref 31–125)
ALT SERPL-CCNC: 13 U/L (ref 6–29)
ANION GAP SERPL CALCULATED.4IONS-SCNC: 12 MMOL/L (CALC) (ref 7–17)
AST SERPL-CCNC: 12 U/L (ref 10–30)
BILIRUB SERPL-MCNC: 0.6 MG/DL (ref 0.2–1.2)
BUN SERPL-MCNC: 6 MG/DL (ref 7–25)
CALCIUM SERPL-MCNC: 9.5 MG/DL (ref 8.6–10.2)
CHLORIDE SERPL-SCNC: 102 MMOL/L (ref 98–110)
CO2 SERPL-SCNC: 21 MMOL/L (ref 20–32)
CREAT SERPL-MCNC: 0.47 MG/DL (ref 0.5–0.96)
EGFRCR SERPLBLD CKD-EPI 2021: 135 ML/MIN/1.73M2
ERYTHROCYTE [DISTWIDTH] IN BLOOD BY AUTOMATED COUNT: 13.1 % (ref 11.5–14.5)
EST. AVERAGE GLUCOSE BLD GHB EST-MCNC: 100 MG/DL
EST. AVERAGE GLUCOSE BLD GHB EST-SCNC: 5.5 MMOL/L
GLUCOSE SERPL-MCNC: 76 MG/DL (ref 65–99)
HBA1C MFR BLD: 5.1 %
HBV SURFACE AG SERPL QL IA: NORMAL
HCT VFR BLD AUTO: 40 % (ref 36–46)
HCV AB SERPL QL IA: NORMAL
HEMOGLOBIN A2: 3 % (ref 2–3.5)
HEMOGLOBIN A: 96.8 % (ref 95.8–98)
HEMOGLOBIN F: 0.2 % (ref 0–2)
HEMOGLOBIN IDENTIFICATION INTERPRETATION: NORMAL
HGB BLD-MCNC: 12.8 G/DL (ref 12–16)
HIV 1+2 AB+HIV1 P24 AG SERPL QL IA: NORMAL
HIV 1+2 AB+HIV1 P24 AG SERPL QL IA: NORMAL
LDH SERPL P TO L-CCNC: 123 U/L (ref 100–200)
MCH RBC QN AUTO: 30.3 PG (ref 26–34)
MCHC RBC AUTO-ENTMCNC: 32 G/DL (ref 32–36)
MCV RBC AUTO: 95 FL (ref 80–100)
NRBC BLD-RTO: 0 /100 WBCS (ref 0–0)
PATH REVIEW-HGB IDENTIFICATION: NORMAL
PLATELET # BLD AUTO: 352 X10*3/UL (ref 150–450)
POTASSIUM SERPL-SCNC: 3.9 MMOL/L (ref 3.5–5.3)
PROT SERPL-MCNC: 7.6 G/DL (ref 6.1–8.1)
RBC # BLD AUTO: 4.23 X10*6/UL (ref 4–5.2)
REFLEX ADDED, ANEMIA PANEL: NORMAL
RUBV IGG SERPL IA-ACNC: <0.9 INDEX
SODIUM SERPL-SCNC: 135 MMOL/L (ref 135–146)
T PALLIDUM AB SER QL IA: NORMAL
URATE SERPL-MCNC: 4.6 MG/DL (ref 2.5–7)
WBC # BLD AUTO: 9.4 X10*3/UL (ref 4.4–11.3)

## 2025-08-19 ENCOUNTER — OFFICE VISIT (OUTPATIENT)
Dept: CARDIOLOGY | Facility: CLINIC | Age: 25
End: 2025-08-19
Payer: COMMERCIAL

## 2025-08-19 VITALS
HEIGHT: 61 IN | HEART RATE: 73 BPM | WEIGHT: 112.2 LBS | DIASTOLIC BLOOD PRESSURE: 64 MMHG | BODY MASS INDEX: 21.18 KG/M2 | OXYGEN SATURATION: 99 % | SYSTOLIC BLOOD PRESSURE: 118 MMHG

## 2025-08-19 DIAGNOSIS — R00.2 PALPITATIONS: ICD-10-CM

## 2025-08-19 DIAGNOSIS — R55 SYNCOPE, UNSPECIFIED SYNCOPE TYPE: ICD-10-CM

## 2025-08-19 DIAGNOSIS — R00.0 TACHYCARDIA: Primary | ICD-10-CM

## 2025-08-19 LAB
ELECTRONICALLY SIGNED BY: NORMAL
EST. AVERAGE GLUCOSE BLD GHB EST-MCNC: 100 MG/DL
EST. AVERAGE GLUCOSE BLD GHB EST-SCNC: 5.5 MMOL/L
FRAGILE X INTERPRETATION: NORMAL
FRAGILE X RESULT: NORMAL
HBA1C MFR BLD: 5.1 %
HBV SURFACE AG SERPL QL IA: NORMAL
HCV AB SERPL QL IA: NORMAL
HIV 1+2 AB+HIV1 P24 AG SERPL QL IA: NORMAL
HIV 1+2 AB+HIV1 P24 AG SERPL QL IA: NORMAL
LDH SERPL P TO L-CCNC: 123 U/L (ref 100–200)
RUBV IGG SERPL IA-ACNC: <0.9 INDEX
T PALLIDUM AB SER QL IA: NEGATIVE
URATE SERPL-MCNC: 4.6 MG/DL (ref 2.5–7)

## 2025-08-19 PROCEDURE — 93010 ELECTROCARDIOGRAM REPORT: CPT | Performed by: STUDENT IN AN ORGANIZED HEALTH CARE EDUCATION/TRAINING PROGRAM

## 2025-08-19 PROCEDURE — 99212 OFFICE O/P EST SF 10 MIN: CPT | Mod: 25

## 2025-08-19 PROCEDURE — 99204 OFFICE O/P NEW MOD 45 MIN: CPT | Performed by: STUDENT IN AN ORGANIZED HEALTH CARE EDUCATION/TRAINING PROGRAM

## 2025-08-19 PROCEDURE — 3008F BODY MASS INDEX DOCD: CPT | Performed by: STUDENT IN AN ORGANIZED HEALTH CARE EDUCATION/TRAINING PROGRAM

## 2025-08-19 PROCEDURE — 93005 ELECTROCARDIOGRAM TRACING: CPT | Performed by: STUDENT IN AN ORGANIZED HEALTH CARE EDUCATION/TRAINING PROGRAM

## 2025-08-20 PROBLEM — O09.899 RUBELLA NON-IMMUNE STATUS, ANTEPARTUM (HHS-HCC): Status: ACTIVE | Noted: 2025-08-20

## 2025-08-20 PROBLEM — Z28.39 RUBELLA NON-IMMUNE STATUS, ANTEPARTUM (HHS-HCC): Status: ACTIVE | Noted: 2025-08-20

## 2025-08-20 LAB
ELECTRONICALLY SIGNED BY: NORMAL
SMA RESULT: NORMAL

## 2025-08-27 ENCOUNTER — ANCILLARY ORDERS (OUTPATIENT)
Dept: OBSTETRICS AND GYNECOLOGY | Facility: CLINIC | Age: 25
End: 2025-08-27
Payer: COMMERCIAL

## 2025-08-27 ENCOUNTER — HOSPITAL ENCOUNTER (OUTPATIENT)
Dept: RADIOLOGY | Facility: CLINIC | Age: 25
Discharge: HOME | End: 2025-08-27
Payer: COMMERCIAL

## 2025-08-27 DIAGNOSIS — Z3A.01 6 WEEKS GESTATION OF PREGNANCY (HHS-HCC): Primary | ICD-10-CM

## 2025-08-27 DIAGNOSIS — Z3A.01 6 WEEKS GESTATION OF PREGNANCY (HHS-HCC): ICD-10-CM

## 2025-08-27 PROCEDURE — 76813 OB US NUCHAL MEAS 1 GEST: CPT

## 2025-09-04 DIAGNOSIS — R00.2 HEART PALPITATIONS: Primary | ICD-10-CM

## 2025-09-04 LAB — COMMENTS - MP RESULT TYPE: NORMAL

## 2025-09-11 ENCOUNTER — APPOINTMENT (OUTPATIENT)
Dept: OBSTETRICS AND GYNECOLOGY | Facility: CLINIC | Age: 25
End: 2025-09-11
Payer: COMMERCIAL

## 2025-09-26 ENCOUNTER — APPOINTMENT (OUTPATIENT)
Dept: CARDIOLOGY | Facility: CLINIC | Age: 25
End: 2025-09-26
Payer: COMMERCIAL

## 2025-10-09 ENCOUNTER — APPOINTMENT (OUTPATIENT)
Dept: OBSTETRICS AND GYNECOLOGY | Facility: CLINIC | Age: 25
End: 2025-10-09
Payer: COMMERCIAL